# Patient Record
Sex: MALE | Race: WHITE
[De-identification: names, ages, dates, MRNs, and addresses within clinical notes are randomized per-mention and may not be internally consistent; named-entity substitution may affect disease eponyms.]

---

## 2018-11-15 ENCOUNTER — HOSPITAL ENCOUNTER (OUTPATIENT)
Dept: HOSPITAL 61 - PCVCCLINIC | Age: 63
Discharge: HOME | End: 2018-11-15
Attending: INTERNAL MEDICINE
Payer: COMMERCIAL

## 2018-11-15 DIAGNOSIS — Z79.82: ICD-10-CM

## 2018-11-15 DIAGNOSIS — E78.00: ICD-10-CM

## 2018-11-15 DIAGNOSIS — I42.0: ICD-10-CM

## 2018-11-15 DIAGNOSIS — I25.5: ICD-10-CM

## 2018-11-15 DIAGNOSIS — Z87.891: ICD-10-CM

## 2018-11-15 DIAGNOSIS — I65.23: ICD-10-CM

## 2018-11-15 DIAGNOSIS — I25.10: Primary | ICD-10-CM

## 2018-11-15 DIAGNOSIS — E78.5: ICD-10-CM

## 2018-11-15 PROCEDURE — 93005 ELECTROCARDIOGRAM TRACING: CPT

## 2018-11-15 PROCEDURE — G0463 HOSPITAL OUTPT CLINIC VISIT: HCPCS

## 2018-11-15 PROCEDURE — 80061 LIPID PANEL: CPT

## 2019-01-08 ENCOUNTER — HOSPITAL ENCOUNTER (OUTPATIENT)
Dept: HOSPITAL 61 - PCVCCLINIC | Age: 64
Discharge: HOME | End: 2019-01-08
Attending: INTERNAL MEDICINE
Payer: COMMERCIAL

## 2019-01-08 DIAGNOSIS — Z87.891: ICD-10-CM

## 2019-01-08 DIAGNOSIS — E78.5: ICD-10-CM

## 2019-01-08 DIAGNOSIS — I25.10: Primary | ICD-10-CM

## 2019-01-08 DIAGNOSIS — I65.23: ICD-10-CM

## 2019-01-08 DIAGNOSIS — Z79.82: ICD-10-CM

## 2019-01-08 DIAGNOSIS — I42.0: ICD-10-CM

## 2019-01-08 PROCEDURE — G0463 HOSPITAL OUTPT CLINIC VISIT: HCPCS

## 2019-01-08 PROCEDURE — 93005 ELECTROCARDIOGRAM TRACING: CPT

## 2019-04-23 ENCOUNTER — HOSPITAL ENCOUNTER (OUTPATIENT)
Dept: HOSPITAL 61 - PCVCCLINIC | Age: 64
Discharge: HOME | End: 2019-04-23
Attending: INTERNAL MEDICINE
Payer: MEDICARE

## 2019-04-23 DIAGNOSIS — I25.5: ICD-10-CM

## 2019-04-23 DIAGNOSIS — I25.10: Primary | ICD-10-CM

## 2019-04-23 DIAGNOSIS — I10: ICD-10-CM

## 2019-04-23 DIAGNOSIS — I42.0: ICD-10-CM

## 2019-04-23 DIAGNOSIS — I65.23: ICD-10-CM

## 2019-04-23 DIAGNOSIS — E78.5: ICD-10-CM

## 2019-04-23 PROCEDURE — 80061 LIPID PANEL: CPT

## 2019-04-23 PROCEDURE — 93005 ELECTROCARDIOGRAM TRACING: CPT

## 2019-04-23 PROCEDURE — G0463 HOSPITAL OUTPT CLINIC VISIT: HCPCS

## 2019-04-23 PROCEDURE — 36415 COLL VENOUS BLD VENIPUNCTURE: CPT

## 2019-11-01 ENCOUNTER — HOSPITAL ENCOUNTER (OUTPATIENT)
Dept: HOSPITAL 61 - PCVCCLINIC | Age: 64
Discharge: HOME | End: 2019-11-01
Attending: INTERNAL MEDICINE
Payer: MEDICARE

## 2019-11-01 DIAGNOSIS — I65.23: ICD-10-CM

## 2019-11-01 DIAGNOSIS — R06.02: ICD-10-CM

## 2019-11-01 DIAGNOSIS — I25.10: Primary | ICD-10-CM

## 2019-11-01 DIAGNOSIS — I25.5: ICD-10-CM

## 2019-11-01 DIAGNOSIS — I42.0: ICD-10-CM

## 2019-11-01 DIAGNOSIS — E78.5: ICD-10-CM

## 2019-11-01 DIAGNOSIS — Z79.82: ICD-10-CM

## 2019-11-01 DIAGNOSIS — Z87.891: ICD-10-CM

## 2019-11-01 DIAGNOSIS — I10: ICD-10-CM

## 2019-11-01 PROCEDURE — 93005 ELECTROCARDIOGRAM TRACING: CPT

## 2019-11-01 PROCEDURE — 36415 COLL VENOUS BLD VENIPUNCTURE: CPT

## 2019-11-01 PROCEDURE — G0463 HOSPITAL OUTPT CLINIC VISIT: HCPCS

## 2019-11-01 PROCEDURE — 80061 LIPID PANEL: CPT

## 2019-12-02 ENCOUNTER — HOSPITAL ENCOUNTER (OUTPATIENT)
Dept: HOSPITAL 61 - PCVCIMAG | Age: 64
Discharge: HOME | End: 2019-12-02
Attending: INTERNAL MEDICINE
Payer: MEDICARE

## 2019-12-02 DIAGNOSIS — I65.23: Primary | ICD-10-CM

## 2019-12-02 DIAGNOSIS — I73.9: ICD-10-CM

## 2019-12-02 DIAGNOSIS — I08.8: ICD-10-CM

## 2019-12-02 DIAGNOSIS — Z87.891: ICD-10-CM

## 2019-12-02 DIAGNOSIS — I42.0: ICD-10-CM

## 2019-12-02 DIAGNOSIS — I25.10: ICD-10-CM

## 2019-12-02 DIAGNOSIS — Z82.49: ICD-10-CM

## 2019-12-02 DIAGNOSIS — I10: ICD-10-CM

## 2019-12-02 DIAGNOSIS — Z95.1: ICD-10-CM

## 2019-12-02 DIAGNOSIS — I25.2: ICD-10-CM

## 2019-12-02 DIAGNOSIS — Z95.5: ICD-10-CM

## 2019-12-02 DIAGNOSIS — E78.5: ICD-10-CM

## 2019-12-02 DIAGNOSIS — I25.5: ICD-10-CM

## 2019-12-02 PROCEDURE — 93351 STRESS TTE COMPLETE: CPT

## 2019-12-02 PROCEDURE — 93325 DOPPLER ECHO COLOR FLOW MAPG: CPT

## 2019-12-02 PROCEDURE — 93880 EXTRACRANIAL BILAT STUDY: CPT

## 2019-12-02 NOTE — PCVCIMAG
--------------- APPROVED REPORT --------------





Study performed:  12/02/2019 10:49:08



Exam:  Stress Echocardiogram

Indication: CAD s/p PCI RCA, mitral regurgitation, ischemic 

cardiomyopathy, dyspnea

Patient Location: Echo lab

Stress Nurse: Judith Almeida RN

Status: routine



Ht: 6 ft 5 in  

HR: 71 bpm      BP: 136/84 mmHg

Rhythm: NSR



Procedure

The patient underwent an Exercise Stress Test using the Antw[employer] 

Protocol. Blood pressure, heart rate, and EKG were monitored.

An Echocardiogram was performed by technician in four stages in quad 

fashion.  At peak stress, four selected images were obtained and 

placed side by side with resting images for comparison.



Stress Test Details

Stress Test:  Exercise stress testing was performed using a Antwan 

protocol.

HR

Resting HR:            71 bpmMax Heart Rate (APMHR): 154 bpm 

Max HR Achieved:  133 bpmTarget HR (85% APMHR): 130 bpm

% of APMHR:         86

Recovery HR:            83 bpm

HR response to stress: Normal HR response to stress



BP

Resting BP:  136/84 mmHg

Max BP:       170/76 mmHg

Recovery BP:       140/78 mmHg

BP response to stress: Normal blood pressure response to 

stress.

ECG

Resting ECG:  Sinus Rhythm w/ old infarct

Stress ECG:     Sinus Rhythm

ST Change: Normal

Maximum ST Deviation: 0 mm

Arrhythmia:    None

Recovery ECG: Sinus Rhythm

Recovery ST Change: Normal

Recovery ST Deviation: 0 mm

Recovery Arrhythmia: None



Clinical

Reason for Termination: Maximal effort

Stress Symptoms: Dyspnea, Leg Fatigue

Exercise duration: 8 min 7 sec

Highest Stage Achieved: Stage 3: 3.4 mph at 14% grade. 

Exercise capacity: 10.1 METs

Overall Exercise Capacity for Age: Normal

Scale: Active

Angina Score: None



Stress ECG Conclusion

Clinical: Non-ischemic

ECG: Non-ischemic

[facility] Treadmill Score is 8.0 which is Low risk.



Pre-Stress Echo

The resting Echocardiogram showed normal left ventricular 

contractility with an estimated Ejection Fraction of about 40-45%. 

The resting Echocardiogram demonstrated wall motion abnormality in 

the mid-inferior, basal inferior walls.

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



Post-Stress Echo

The stress Echocardiogram showed normal left ventricular 

contractility with an estimated Ejection Fraction of about 50-55%. 

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 No new regional 

wall motion abnormalities.



Clinical

No clinical or ECG evidence for ischemia.



Conclusion

Clinical Response:  Non-ischemic

Exercise Capacity:  Average

Stress ECG Response:  Non-ischemic

Stress Echo Images:  Non-ischemic

Non-ischemic stress echocardiogram with maximal exercise stress.  

Prior inferior infarct

Mild-moderate posterior mitral regurgitation. No tricuspid 

regurgitation or stenosis. Mild pulmonic and mitral regurgitation. No 

valvular stenosis.



Other Information

Study Quality: Adequate



<Conclusion>

Non-ischemic stress echocardiogram with maximal exercise stress.  

Prior inferior infarct

Mild-moderate posterior mitral regurgitation. No tricuspid 

regurgitation or stenosis. Mild pulmonic and mitral regurgitation. No 

valvular stenosis.

## 2019-12-02 NOTE — PCVCIMAG
--------------- APPROVED REPORT 

--------------





Indications

Stenosis



Risk Factors

Hypertension: 

CAD



Doppler Spectral Velocity Analysis

PSV  /  EDVPSV  /  EDV

ECA (R)     126 / 21 cm/sECA (L)     254 / 38 cm/s



dICA (R)    61 / 25 cm/sdICA (L)     80 / 32 cm/s

Dorothea (R)     86 / 32 cm/smICA (L)     87 / 32 cm/s

pICA (R)     68 / 26 cm/spICA (L)     78 / 28 cm/s



Bulb (R)        75 / 18 cm/sBulb (L)         81 / 22 cm/s



dCCA (R)     84 / 23 cm/sdCCA (L)     90 / 27 cm/s

mCCA (R)    98 / 25 cm/smCCA (L)    133 / 34 cm/s



Vert (R)     55 / 19 cm/sVert (L)     67 / 24 

cm/s

ICA/CCA     0.97



Basic Measurements

Blood Pressure:                                                 

Pulses:                       

                                Right           Left               

             RightLeft

Brachial(Sitting)               126/21hnMw596/76mmHgTemporal     





Real Time B-Mode Imaging

Vert. (R)AntegradeVert. (L)Antegrade



Findings

The right carotid bulb has moderate calcified plaque.

The right proximal internal carotid artery shows <40% 

stenosis.

The right common carotid artery shows no significant stenosis.

The right external carotid artery shows no significant stenosis.

The left carotid bulb has mild  plaque.

The left proximal internal carotid artery shows <40% stenosis.

The left common carotid artery shows no significant stenosis.

The left external carotid artery shows >50% 

stenosis.



Conclusion

1. Right internal carotid artery stenosis (<40%).

2. Left internal carotid artery stenosis (<40%)

3. Antegrade vertebral flow

## 2020-05-14 ENCOUNTER — HOSPITAL ENCOUNTER (OUTPATIENT)
Dept: HOSPITAL 35 - SJCVC | Age: 65
End: 2020-05-14
Attending: INTERNAL MEDICINE
Payer: COMMERCIAL

## 2020-05-14 DIAGNOSIS — E78.5: ICD-10-CM

## 2020-05-14 DIAGNOSIS — I10: ICD-10-CM

## 2020-05-14 DIAGNOSIS — I42.0: ICD-10-CM

## 2020-05-14 DIAGNOSIS — Z82.49: ICD-10-CM

## 2020-05-14 DIAGNOSIS — I65.23: ICD-10-CM

## 2020-05-14 DIAGNOSIS — I25.10: ICD-10-CM

## 2020-05-14 DIAGNOSIS — Z79.82: ICD-10-CM

## 2020-05-14 DIAGNOSIS — I25.5: ICD-10-CM

## 2020-05-14 DIAGNOSIS — R94.31: Primary | ICD-10-CM

## 2020-05-14 DIAGNOSIS — Z79.899: ICD-10-CM

## 2020-05-14 DIAGNOSIS — Z87.891: ICD-10-CM

## 2020-11-13 ENCOUNTER — HOSPITAL ENCOUNTER (OUTPATIENT)
Dept: HOSPITAL 35 - SJCVC | Age: 65
End: 2020-11-13
Attending: INTERNAL MEDICINE
Payer: COMMERCIAL

## 2020-11-13 DIAGNOSIS — Z95.1: ICD-10-CM

## 2020-11-13 DIAGNOSIS — R00.1: ICD-10-CM

## 2020-11-13 DIAGNOSIS — I25.10: ICD-10-CM

## 2020-11-13 DIAGNOSIS — I65.23: ICD-10-CM

## 2020-11-13 DIAGNOSIS — I25.2: ICD-10-CM

## 2020-11-13 DIAGNOSIS — E78.5: ICD-10-CM

## 2020-11-13 DIAGNOSIS — Z79.899: ICD-10-CM

## 2020-11-13 DIAGNOSIS — Z87.891: ICD-10-CM

## 2020-11-13 DIAGNOSIS — R94.31: Primary | ICD-10-CM

## 2020-11-13 DIAGNOSIS — I25.5: ICD-10-CM

## 2020-11-13 DIAGNOSIS — I42.0: ICD-10-CM

## 2020-11-13 DIAGNOSIS — I10: ICD-10-CM

## 2021-05-13 ENCOUNTER — HOSPITAL ENCOUNTER (OUTPATIENT)
Dept: HOSPITAL 35 - SJCVC | Age: 66
End: 2021-05-13
Attending: INTERNAL MEDICINE
Payer: COMMERCIAL

## 2021-05-13 DIAGNOSIS — E78.5: ICD-10-CM

## 2021-05-13 DIAGNOSIS — Z82.49: ICD-10-CM

## 2021-05-13 DIAGNOSIS — I25.10: ICD-10-CM

## 2021-05-13 DIAGNOSIS — R00.1: ICD-10-CM

## 2021-05-13 DIAGNOSIS — Z79.899: ICD-10-CM

## 2021-05-13 DIAGNOSIS — I42.0: ICD-10-CM

## 2021-05-13 DIAGNOSIS — R94.31: ICD-10-CM

## 2021-05-13 DIAGNOSIS — I73.9: ICD-10-CM

## 2021-05-13 DIAGNOSIS — Z98.61: ICD-10-CM

## 2021-05-13 DIAGNOSIS — I10: ICD-10-CM

## 2021-05-13 DIAGNOSIS — Z87.891: ICD-10-CM

## 2021-05-13 DIAGNOSIS — I65.23: Primary | ICD-10-CM

## 2021-05-13 DIAGNOSIS — Z79.82: ICD-10-CM

## 2021-05-13 DIAGNOSIS — E78.00: ICD-10-CM

## 2021-05-13 DIAGNOSIS — I25.5: ICD-10-CM

## 2021-05-13 DIAGNOSIS — I25.2: ICD-10-CM

## 2021-11-05 ENCOUNTER — HOSPITAL ENCOUNTER (OUTPATIENT)
Dept: HOSPITAL 35 - SJCVC | Age: 66
End: 2021-11-05
Attending: INTERNAL MEDICINE
Payer: COMMERCIAL

## 2021-11-05 DIAGNOSIS — I42.0: ICD-10-CM

## 2021-11-05 DIAGNOSIS — I25.5: ICD-10-CM

## 2021-11-05 DIAGNOSIS — E78.5: ICD-10-CM

## 2021-11-05 DIAGNOSIS — I10: ICD-10-CM

## 2021-11-05 DIAGNOSIS — E78.00: ICD-10-CM

## 2021-11-05 DIAGNOSIS — Z87.891: ICD-10-CM

## 2021-11-05 DIAGNOSIS — Z95.5: ICD-10-CM

## 2021-11-05 DIAGNOSIS — Z79.899: ICD-10-CM

## 2021-11-05 DIAGNOSIS — R00.1: ICD-10-CM

## 2021-11-05 DIAGNOSIS — I65.23: ICD-10-CM

## 2021-11-05 DIAGNOSIS — Z79.82: ICD-10-CM

## 2021-11-05 DIAGNOSIS — R94.31: Primary | ICD-10-CM

## 2021-11-05 DIAGNOSIS — I25.10: ICD-10-CM

## 2021-11-05 DIAGNOSIS — Z82.49: ICD-10-CM
